# Patient Record
Sex: FEMALE | Race: WHITE
[De-identification: names, ages, dates, MRNs, and addresses within clinical notes are randomized per-mention and may not be internally consistent; named-entity substitution may affect disease eponyms.]

---

## 2020-11-15 ENCOUNTER — HOSPITAL ENCOUNTER (EMERGENCY)
Dept: HOSPITAL 100 - ED | Age: 50
Discharge: HOME | End: 2020-11-15
Payer: COMMERCIAL

## 2020-11-15 VITALS
DIASTOLIC BLOOD PRESSURE: 98 MMHG | OXYGEN SATURATION: 99 % | TEMPERATURE: 97.6 F | SYSTOLIC BLOOD PRESSURE: 182 MMHG | HEART RATE: 98 BPM | RESPIRATION RATE: 16 BRPM

## 2020-11-15 VITALS
OXYGEN SATURATION: 98 % | DIASTOLIC BLOOD PRESSURE: 87 MMHG | SYSTOLIC BLOOD PRESSURE: 148 MMHG | HEART RATE: 100 BPM | RESPIRATION RATE: 18 BRPM

## 2020-11-15 VITALS — BODY MASS INDEX: 31 KG/M2

## 2020-11-15 DIAGNOSIS — R10.9: Primary | ICD-10-CM

## 2020-11-15 LAB
LEUKOCYTE ESTERASE UR QL STRIP: 25 /UL
MUCOUS THREADS URNS QL MICRO: (no result) /HPF
RBC UR QL: (no result) /HPF (ref 0–5)
SP GR UR: 1.01 (ref 1–1.03)
SQUAMOUS URNS QL MICRO: (no result) /HPF (ref 5–10)
TRANSITIONAL EPITHELIAL - UR: (no result) /HPF (ref 0–5)
URINE PRESERVATIVE: (no result)

## 2020-11-15 PROCEDURE — 96376 TX/PRO/DX INJ SAME DRUG ADON: CPT

## 2020-11-15 PROCEDURE — 87088 URINE BACTERIA CULTURE: CPT

## 2020-11-15 PROCEDURE — 96374 THER/PROPH/DIAG INJ IV PUSH: CPT

## 2020-11-15 PROCEDURE — 74176 CT ABD & PELVIS W/O CONTRAST: CPT

## 2020-11-15 PROCEDURE — 81001 URINALYSIS AUTO W/SCOPE: CPT

## 2020-11-15 PROCEDURE — 96375 TX/PRO/DX INJ NEW DRUG ADDON: CPT

## 2020-11-15 PROCEDURE — 99283 EMERGENCY DEPT VISIT LOW MDM: CPT

## 2020-11-15 PROCEDURE — 96361 HYDRATE IV INFUSION ADD-ON: CPT

## 2020-11-15 PROCEDURE — A4216 STERILE WATER/SALINE, 10 ML: HCPCS

## 2020-11-15 PROCEDURE — 87086 URINE CULTURE/COLONY COUNT: CPT

## 2023-10-20 RX ORDER — BUPROPION HYDROCHLORIDE 300 MG/1
TABLET ORAL
COMMUNITY
End: 2023-12-08 | Stop reason: ALTCHOICE

## 2023-10-20 RX ORDER — BIOTIN 10 MG
1 TABLET ORAL DAILY
COMMUNITY

## 2023-10-20 RX ORDER — CHOLECALCIFEROL (VITAMIN D3) 25 MCG
TABLET ORAL
COMMUNITY

## 2023-10-23 ENCOUNTER — EVALUATION (OUTPATIENT)
Dept: PHYSICAL THERAPY | Facility: CLINIC | Age: 53
End: 2023-10-23
Payer: COMMERCIAL

## 2023-10-23 DIAGNOSIS — M79.672 PAIN IN LEFT FOOT: Primary | ICD-10-CM

## 2023-10-23 DIAGNOSIS — M79.671 PAIN IN RIGHT FOOT: ICD-10-CM

## 2023-10-23 DIAGNOSIS — M76.61 ACHILLES TENDINITIS, RIGHT LEG: ICD-10-CM

## 2023-10-23 DIAGNOSIS — M20.22 HALLUX RIGIDUS, LEFT FOOT: ICD-10-CM

## 2023-10-23 PROCEDURE — 97110 THERAPEUTIC EXERCISES: CPT | Mod: GP

## 2023-10-23 PROCEDURE — 97161 PT EVAL LOW COMPLEX 20 MIN: CPT | Mod: GP

## 2023-10-23 ASSESSMENT — PAIN SCALES - GENERAL: PAINLEVEL_OUTOF10: 3

## 2023-10-23 ASSESSMENT — PAIN - FUNCTIONAL ASSESSMENT: PAIN_FUNCTIONAL_ASSESSMENT: 0-10

## 2023-10-23 NOTE — Clinical Note
October 25, 2023     Patient: Narda Benoit   YOB: 1970   Date of Visit: 10/23/2023       To Whom It May Concern:    It is my medical opinion that Narda Benoit {Work release (duty restriction):32510}.    If you have any questions or concerns, please don't hesitate to call.         Sincerely,        Ashley Walls, PT    CC: No Recipients

## 2023-10-23 NOTE — PROGRESS NOTES
Physical Therapy    Physical Therapy Evaluation and Treatment      Patient Name: Narda Benoit  MRN: [id number]  Today's Date: 10/23/2023  Time Calculation  Start Time: 1531  Stop Time: 1610  Time Calculation (min): 39 min      Assessment:  PT Assessment Results: Decreased strength, Decreased range of motion, Impaired balance, Decreased mobility, Pain  Rehab Prognosis: Fair  Strengths: Ability to acquire knowledge, Capable of completing ADLs semi/independent, Premorbid level of function, Rehab experience  Barriers to Participation: Other (Comment) (chronicity of pain; hx of OA in B foot)  Patient demonstrating B foot pain, deficits in B ankle AROM/strength, restriction of surrounding musculature of B ankle, and deficits in functional mobility per LEFS score. Patient would benefit from skilled outpatient PT interventions 1x/week for 4 weeks to address above impairments and improve ease with standing/ambulation/transfers. Educated in intrinsic foot strengthening exercises to perform within painfree ROM. Increased weakness noted of foot intrinsics. No exacerbation of pain. HEP handout provided. Patient verbalized and demonstrated understanding of POC and HEP.    Plan:  Treatment/Interventions: Dry needling, Cryotherapy, Education/ Instruction, Electrical stimulation, Manual therapy, Self care/ home management, Therapeutic activities, Therapeutic exercises, Ultrasound  PT Plan: Skilled PT  PT Frequency: 1 time per week  Duration: 4 weeks up to 4 additional visits    Current Problem:   1. Pain in left foot  Follow Up In Physical Therapy      2. Achilles tendinitis, right leg Active Referral to Physical Therapy    Follow Up In Physical Therapy      3. Pain in right foot  Follow Up In Physical Therapy      4. Hallux rigidus, left foot  Follow Up In Physical Therapy          Subjective    General: Patient reports ~3 years ago having R achilles tear from walking [place]. Had performed PT prior and it was doing okay but now  "the pain is not calming down. R achilles tendonitis as well as rigid 1st toe on the L foot which makes walking more challenging. Big toe is more painful. Patient attempting to avoid surgery. Not wanting to perform injection as well d/t needing to have joint be \"pulled apart\" R foot pain into heel with tenderness to touch. Pain of L big toe only. Patient performing stretches at home which does help. Pain with standing, ambulation, and prolonged sitting when transferring into weightbearing.  General  Reason for Referral: B foot pain  Referred By: Jalil GRANT  Past Medical History Relevant to Rehab: Previous PT for R achilles tear; no recent PT interventions  Precautions:  Precautions  STEADI Fall Risk Score (The score of 4 or more indicates an increased risk of falling): 0  Precautions Comment: Hx of OA    Pain:  Pain Assessment  Pain Assessment: 0-10  Pain Score: 3  Pain Type: Chronic pain  Pain Location: Foot  Pain Orientation: Other (Comment) (bilateral)  Home Living:   No concerns for home living  Prior Level of Function:  Prior Function Per Pt/Caregiver Report  Level of [place]: Independent with ADLs and functional transfers    Objective     ANKLE    Ankle Palpation/Joint Mobility Assessment  Palpation/Joint Mobility Comment: R foot: pain with palpation of achilles insertion, plantar fascia at heel insertion; mod restriction of gastroc; L LE with increased stiffness of 1st MTP and pain. tenderness with palpation of L MTP and restriction of dorsum of foot near MTP  Ankle AROM WFL unless documented below  R ankle dorsiflexion: (10°): 10 deg with pain into the heel  L ankle dorsiflexion: (10°): 5 deg  R ankle plantarflexion: (40°): 40 deg  L ankle plantarflexion: (40°): 40 deg  R ankle inversion: (30°): 50 deg  L ankle inversion: (30°): 55 deg  R ankle eversion: (20°): 18 deg  L ankle eversion: (20°): 20 deg  Ankle MMT WFL unless documented below  R ankle dorsiflexion: (5/5): 5  L ankle dorsiflexion: (5/5): " 4  R ankle plantarflexion: (5/5): 4  L ankle plantarflexion: (5/5): 4  R ankle inversion: (5/5): 4  L ankle inversion: (5/5): 4  R ankle eversion: (5/5): 4  L ankle eversion: (5/5): 4    Outcome Measures:  Other Measures  Lower Extremity Funtional Score (LEFS): 60/80     Treatments:  Therapeutic Exercise  Therapeutic Exercise Performed: Yes    Education on POC and HEP  Seated arch lifts x10 each foot  Seated lesser toe extensions x10 each foot  Seated great toe extension x10 each foot  Towel scrunches x10 each foot    OP EDUCATION:  Education  Individual(s) Educated: Patient  Education Provided: POC, Body Mechanics, Home Exercise Program  Risk and Benefits Discussed with Patient/Caregiver/Other: yes  Patient/Caregiver Demonstrated Understanding: yes  Plan of Care Discussed and Agreed Upon: yes  Patient Response to Education: Patient/Caregiver Verbalized Understanding of Information, Patient/Caregiver Performed Return Demonstration of Exercises/Activities, Patient/Caregiver Asked Appropriate Questions  Education Comment: Access Code: [email or web address]  URL: [email or web address]  Date: 10/23/2023  Prepared by: Ashley Walls    Exercises  - Seated Arch Lifts  - 1 x daily - 7 x weekly - 1-2 sets - 10 reps  - Seated Lesser Toes Extension  - 1 x daily - 7 x weekly - 1-2 sets - 10 reps  - Seated Great Toe Extension  - 1 x daily - 7 x weekly - 1-2 sets - 10 reps  - Towel Scrunches  - 1 x daily - 7 x weekly - 1-2 sets - 10 reps    Goals:  Active       PT Goals       Goals       Start:  10/29/23    Expected End:  12/03/23       Increase in LEFS score by 10 points to demonstrate improved functional mobility of B ankles for ease with standing and walking.-Week 4  Improved gross B ankle, knee, hip musculature strength 5/5 MMT to increase stability/support with standing and ambulation at home, community, and work.-Week 4  Decrease in baseline pain of B feet 1/10 or less to demonstrate improved QOL-Week  4  Improved B ankle AROM DF: 10 deg painfree for improved ease with ADL/iADL completion in standing/weightbearing-Week 4  Patient will demonstrate compliance in their home exercise program in order to promote independence in self management of functional mobility.-Week 2

## 2023-10-23 NOTE — Clinical Note
October 25, 2023     Patient: Narda Benoit   YOB: 1970   Date of Visit: 10/23/2023       To Whom it May Concern:    Narda Benoit was seen in my clinic on 10/23/2023. She {Return to school/sport:25179}.    If you have any questions or concerns, please don't hesitate to call.         Sincerely,          Ashley Walls, PT        CC: No Recipients

## 2023-10-29 ASSESSMENT — PATIENT HEALTH QUESTIONNAIRE - PHQ9
2. FEELING DOWN, DEPRESSED OR HOPELESS: NOT AT ALL
1. LITTLE INTEREST OR PLEASURE IN DOING THINGS: NOT AT ALL
SUM OF ALL RESPONSES TO PHQ9 QUESTIONS 1 AND 2: 0

## 2023-11-03 ENCOUNTER — TREATMENT (OUTPATIENT)
Dept: PHYSICAL THERAPY | Facility: CLINIC | Age: 53
End: 2023-11-03
Payer: COMMERCIAL

## 2023-11-03 DIAGNOSIS — M79.671 PAIN IN RIGHT FOOT: ICD-10-CM

## 2023-11-03 DIAGNOSIS — M76.61 ACHILLES TENDINITIS, RIGHT LEG: ICD-10-CM

## 2023-11-03 DIAGNOSIS — M20.22 HALLUX RIGIDUS, LEFT FOOT: ICD-10-CM

## 2023-11-03 DIAGNOSIS — M79.672 PAIN IN LEFT FOOT: ICD-10-CM

## 2023-11-03 PROCEDURE — 97110 THERAPEUTIC EXERCISES: CPT | Mod: GP,CQ

## 2023-11-03 ASSESSMENT — PAIN - FUNCTIONAL ASSESSMENT: PAIN_FUNCTIONAL_ASSESSMENT: 0-10

## 2023-11-03 ASSESSMENT — PAIN SCALES - GENERAL: PAINLEVEL_OUTOF10: 3

## 2023-11-03 NOTE — PROGRESS NOTES
"Physical Therapy Treatment    Patient Name: Narda Benoit  MRN: [id number]  Today's Date: 11/3/2023  Time Calculation  Start Time: 1000  Stop Time: 1043  Time Calculation (min): 43 min      Assessment:  Fair tolerance with TE with no c/o increased discomfort.  Pt. Had difficulty with towel crunches.  States big toe does not like to move especially into extensions.  Pt. Continues with tightness with Achilles tendon with the right ankle.  Handout provided and reviewed with patient.       Plan:  Continue with strengthening, ROM and flexibility to improve ambulation and stairs with greater ease.  MB     Treatment/Interventions: Dry needling, Cryotherapy, Education/ Instruction, Electrical stimulation, Manual therapy, Self care/ home management, Therapeutic activities, Therapeutic exercises, Ultrasound  PT Plan: Skilled PT  PT Frequency: 1 time per week  Duration: 4 weeks up to 4 additional visits    Current Problem  Problem List Items Addressed This Visit             ICD-10-CM    Pain in left foot M79.672    Pain in right foot M79.671    Achilles tendinitis, right leg M76.61    Hallux rigidus, left foot M20.22       Subjective   Pt. States she's compliant with exercises.  Foot is stiff in the mornings.  Pt. Has difficulty with stairs especially when going down.     Precautions   STEADI Fall Risk Score (The score of 4 or more indicates an increased risk of falling): 0  Precautions Comment: Hx of OA     Pain  Pain Assessment: 0-10  Pain Score: 3  Pain Type: Chronic pain  Pain Location: Foot    Treatments:  Therapeutic Exercise  Therapeutic Exercise Performed: Yes    Therapeutic Exercise:  mins/units   Stepper, Lv 1 x5 mins (N)  Slant board x2  30\" ea B  (N)  Seated arch lifts x10 each foot  Seated lesser toe extensions x10 each foot  Seated great toe extension x10 each foot  Towel scrunches x10 each foot  Seated DF stretches w/strap x10  10\" hold B (N)  Seated ankle DF/PF, orange band x10  (N)  Seated toe  " "w/highlighter x10 ea (N)  PROM to Achilles tendon and great toes (N)    OP EDUCATION:   Access Code: [email or web address]  URL: [email or web address]  Date: 11/03/2023  Prepared by: Patricia Allen    Exercises  - Slant Board Gastrocnemius Stretch  - 1 x daily - 7 x weekly - 1 sets - 2 reps - 60\" hold  - Long Sitting Calf Stretch with Strap  - 1 x daily - 7 x weekly - 1 sets - 10 reps - 10\" hold  - Ankle Dorsiflexion with Resistance  - 1 x daily - 7 x weekly - 2 sets - 10 reps  - Ankle Plantar Flexion with Resistance  - 1 x daily - 7 x weekly - 2 sets - 10 reps    Goals:  Active       PT Goals       Goals       Start:  10/29/23    Expected End:  12/03/23       Increase in LEFS score by 10 points to demonstrate improved functional mobility of B ankles for ease with standing and walking.-Week 4  Improved gross B ankle, knee, hip musculature strength 5/5 MMT to increase stability/support with standing and ambulation at home, community, and work.-Week 4  Decrease in baseline pain of B feet 1/10 or less to demonstrate improved QOL-Week 4  Improved B ankle AROM DF: 10 deg painfree for improved ease with ADL/iADL completion in standing/weightbearing-Week 4  Patient will demonstrate compliance in their home exercise program in order to promote independence in self management of functional mobility.-Week 2            "

## 2023-11-13 ENCOUNTER — APPOINTMENT (OUTPATIENT)
Dept: PHYSICAL THERAPY | Facility: CLINIC | Age: 53
End: 2023-11-13
Payer: COMMERCIAL

## 2023-11-20 ENCOUNTER — APPOINTMENT (OUTPATIENT)
Dept: PHYSICAL THERAPY | Facility: CLINIC | Age: 53
End: 2023-11-20
Payer: COMMERCIAL

## 2023-11-27 ENCOUNTER — APPOINTMENT (OUTPATIENT)
Dept: PHYSICAL THERAPY | Facility: CLINIC | Age: 53
End: 2023-11-27
Payer: COMMERCIAL

## 2023-12-06 ENCOUNTER — TELEPHONE (OUTPATIENT)
Dept: URGENT CARE | Facility: CLINIC | Age: 53
End: 2023-12-06

## 2023-12-06 ENCOUNTER — OFFICE VISIT (OUTPATIENT)
Dept: URGENT CARE | Facility: CLINIC | Age: 53
End: 2023-12-06
Payer: COMMERCIAL

## 2023-12-06 VITALS
WEIGHT: 165 LBS | TEMPERATURE: 99.7 F | DIASTOLIC BLOOD PRESSURE: 96 MMHG | OXYGEN SATURATION: 98 % | HEIGHT: 62 IN | RESPIRATION RATE: 16 BRPM | HEART RATE: 116 BPM | BODY MASS INDEX: 30.36 KG/M2 | SYSTOLIC BLOOD PRESSURE: 156 MMHG

## 2023-12-06 DIAGNOSIS — J06.9 VIRAL URI: ICD-10-CM

## 2023-12-06 DIAGNOSIS — H65.01 NON-RECURRENT ACUTE SEROUS OTITIS MEDIA OF RIGHT EAR: Primary | ICD-10-CM

## 2023-12-06 DIAGNOSIS — U07.1 COVID: ICD-10-CM

## 2023-12-06 DIAGNOSIS — Z20.822 CLOSE EXPOSURE TO COVID-19 VIRUS: ICD-10-CM

## 2023-12-06 LAB — POC SARS-COV-2 AG: ABNORMAL

## 2023-12-06 PROCEDURE — 99213 OFFICE O/P EST LOW 20 MIN: CPT | Mod: 25 | Performed by: NURSE PRACTITIONER

## 2023-12-06 RX ORDER — AMOXICILLIN 875 MG/1
875 TABLET, FILM COATED ORAL 2 TIMES DAILY
Qty: 14 TABLET | Refills: 0 | Status: SHIPPED | OUTPATIENT
Start: 2023-12-06 | End: 2023-12-13

## 2023-12-06 NOTE — PROGRESS NOTES
53 y.o. female presents for evaluation of URI.  Symptoms including cough, congestion, body aches, malaise, bilateral ear pain and headache have been present for several days and refractory to OTC meds.  No fever, chills, nausea, vomiting, abdominal pain, CP, or SOB.  No exacerbating factors. Known COVID 19 exposure from .    Vitals:    12/06/23 1049   BP: (!) 156/96   Pulse: (!) 116   Resp: 16   Temp: 37.6 °C (99.7 °F)   SpO2: 98%       Allergies   Allergen Reactions    Sulfa Dyne Other and Unknown     Causes headaches    Nitrofurantoin Monohyd/M-Cryst Hives    Prednisone Other     hypertensive crisis       Medication Documentation Review Audit       Reviewed by DEBBIE Esteban (Nurse Practitioner) on 12/06/23 at 1107      Medication Order Taking? Sig Documenting Provider Last Dose Status   amoxicillin (Amoxil) 875 mg tablet [id number]  Take 1 tablet (875 mg) by mouth 2 times a day for 7 days. DEBBIE Esteban  Active   biotin 10 mg tablet [id number] Yes Take 1 tablet (10 mg) by mouth once daily. Historical Provider, MD Taking Active   buPROPion XL (Wellbutrin XL) 300 mg 24 hr tablet [id number] No 1 tab(s) orally every 24 hours Historical Provider, MD Not Taking Active   cholecalciferol (Vitamin D-3) 25 MCG (1000 UT) tablet [id number] Yes orally once a day Historical Provider, MD Taking Active   fexofenadine ODT (Allegra ODT) 30 mg disintegrating tablet [id number] No orally once a day Historical Provider, MD Not Taking Active   loratadine-pseudoephedrine (Claritin-D 12-hour) 5-120 mg 12 hr tablet [id number] Yes Take 1 tablet by mouth 2 times a day. Do not crush, chew, or split. Historical Provider, MD Taking Active   NON FORMULARY [id number] Yes estratone   - orally once a day Historical Provider, MD Taking Active                    History reviewed. No pertinent past medical history.    History reviewed. No pertinent surgical history.    ROS  See HPI    Physical Exam  Vitals and nursing note  reviewed.   Constitutional:       Appearance: She is ill-appearing (mildly).   HENT:      Head: Normocephalic and atraumatic.      Right Ear: Hearing and ear canal normal. Tympanic membrane is erythematous. Tympanic membrane is not bulging.      Left Ear: Hearing and ear canal normal. A middle ear effusion is present. Tympanic membrane is not erythematous or bulging.      Nose: Nose normal.      Mouth/Throat:      Mouth: Mucous membranes are moist.      Pharynx: Oropharynx is clear.   Eyes:      Extraocular Movements: Extraocular movements intact.      Conjunctiva/sclera: Conjunctivae normal.      Pupils: Pupils are equal, round, and reactive to light.   Cardiovascular:      Rate and Rhythm: Regular rhythm. Tachycardia present.      Heart sounds: Normal heart sounds.   Pulmonary:      Effort: Pulmonary effort is normal.      Breath sounds: Normal breath sounds.   Lymphadenopathy:      Cervical: Cervical adenopathy present.   Skin:     General: Skin is warm and dry.      Capillary Refill: Capillary refill takes less than 2 seconds.   Neurological:      General: No focal deficit present.      Mental Status: She is alert and oriented to person, place, and time.   Psychiatric:         Mood and Affect: Mood normal.         Behavior: Behavior normal.       Recent Results (from the past 1 hour(s))   POCT BD Veritor COVID-19 AG    Collection Time: 12/06/23 11:43 AM   Result Value Ref Range    POC SARS-CoV Ag Positive test for SARS-CoV-2 (antigen detected) (A) Presumptive negative test for SARS-CoV-2 (no antigen detected)       Assessment/Plan/MDM  Narda was seen today for uri.  Diagnoses and all orders for this visit:  Non-recurrent acute serous otitis media of right ear (Primary)  -     amoxicillin (Amoxil) 875 mg tablet; Take 1 tablet (875 mg) by mouth 2 times a day for 7 days.  Viral URI  -     POCT BD Veritor COVID-19 AG  Close exposure to COVID-19 virus  -     POCT BD Veritor COVID-19 AG  COVID    Encouraged pt to  continue otc cold remedies PRN, push by mouth fluids and rest. Patient's clinical presentation is otherwise unremarkable at this time. Patient is discharged with instructions to follow-up with primary care or seek emergency medical attention for worsening symptoms or any new concerns.    Ricki Hicks CNP  [facility]  [phone]

## 2023-12-06 NOTE — TELEPHONE ENCOUNTER
CALLED PATIENT WITH TEST RESULTS, I INFORMED HER SHE WAS POSITIVE FOR COVID. I ALSO, INFORMED HER THE CDC SAYS TO QUARANTINE FORE 5 DAYS FROM THE TIME HER SYMPTOMS STARTED AND TO WEAR A MASK FOR REMAINDER OF THE TIME IF SYMPTOMS ARE GONE. IF STILL HAVING SYMPTOMS CONTINUE TO QUARANTINE FOR THE REMAINDER OF THE 5 DAYS.

## 2023-12-08 ENCOUNTER — OFFICE VISIT (OUTPATIENT)
Dept: URGENT CARE | Facility: CLINIC | Age: 53
End: 2023-12-08
Payer: COMMERCIAL

## 2023-12-08 VITALS
DIASTOLIC BLOOD PRESSURE: 85 MMHG | TEMPERATURE: 98.1 F | OXYGEN SATURATION: 98 % | WEIGHT: 165 LBS | HEART RATE: 92 BPM | RESPIRATION RATE: 16 BRPM | HEIGHT: 62 IN | SYSTOLIC BLOOD PRESSURE: 136 MMHG | BODY MASS INDEX: 30.36 KG/M2

## 2023-12-08 DIAGNOSIS — U07.1 COVID: Primary | ICD-10-CM

## 2023-12-08 PROCEDURE — 99213 OFFICE O/P EST LOW 20 MIN: CPT | Mod: 25 | Performed by: NURSE PRACTITIONER

## 2023-12-08 NOTE — PROGRESS NOTES
[facility]  Roxy WARREN Ross, APRN-CNP     Visit Note - 12/8/2023 12:21 PM   This note was generated with voice recognition software and may contain errors including spelling, grammar, syntax, and misrecognization of what was dictated.    Patient: Narda Benoit, MRN: [id number], 53 y.o., female   PCP: Charlie Trevino MD  ------------------------------------  ALLERGIES:   Allergies   Allergen Reactions    Sulfa Dyne Other and Unknown     Causes headaches    Nitrofurantoin Monohyd/M-Cryst Hives    Prednisone Other     hypertensive crisis        CURRENT MEDICATIONS:   Current Outpatient Medications   Medication Instructions    amoxicillin (AMOXIL) 875 mg, oral, 2 times daily    biotin 10 mg tablet 1 tablet, oral, Daily    cholecalciferol (Vitamin D-3) 25 MCG (1000 UT) tablet orally once a day    loratadine-pseudoephedrine (Claritin-D 12-hour) 5-120 mg 12 hr tablet 1 tablet, oral, 2 times daily, Do not crush, chew, or split.    NON FORMULARY <BR>estratone <BR>- orally once a day<BR><BR>     ------------------------------------  PAST MEDICAL HX:  Patient Active Problem List   Diagnosis    Pain in left foot    Pain in right foot    Achilles tendinitis, right leg    Hallux rigidus, left foot    Also reports history of fibromyalgia.   SURGICAL HX:  History reviewed. No pertinent surgical history.   FAMILY HX:   No pertinent history.   SOCIAL HX:    reports that she has never smoked. She has never used smokeless tobacco. . Employed - is a .   ------------------------------------  CHIEF COMPLAINT:   Chief Complaint   Patient presents with    URI     Bilateral ear pain, sinus pressure. Evaluated 12/6, currently on amoxicillin day 3 reports sx are worsening       HISTORY OF PRESENT ILLNESS: The history was obtained from patientBianca Laboy is a 53 y.o. female, who presents with a chief complaint of bilat ear discomfort, sinus pressure, nasal congestion (clear mucus), sinus  "headaches, fevers/chills (Tmax 101f) - sxs started on Monday night. She has also had intermittent nausea, but she attributes this to her current antibiotic - she was seen on 12/6/23 by another provider and started on Amoxicillin (prior to her testing + for COVID). She denies any abdominal pain, chest pain, wheezing/shortness of breath, rashes, urinary symptoms, vomiting, and diarrhea. Denies any lightheadedness or dizziness; no changes in mental status. No swelling in legs. Appetite is decreased ; is able to eat and drink fluids without difficulty; denies loss of sense of taste or smell. Reports symptoms are unchanged since onset - reports is concerned and feels her COVID symptoms have resolved, but is worried she might have a sinus infection now; also wonders when she can return to work. Has been taking  OTC medications  without much relief; no other over-the-counter medications or home remedies for symptom management.  Her  also recently tested + for COVID; no other known ill contacts. Has not received the COVID vaccine. No known history of COVID infection.  No known history of asthma/COPD/respiratory issues      REVIEW OF SYSTEMS:  10 systems reviewed negative with exception of history of present illness as listed above.    TODAY'S VITALS: /85 (BP Location: Right arm, Patient Position: Sitting, BP Cuff Size: Large adult)   Pulse 92   Temp 36.7 °C (98.1 °F) (Temporal)   Resp 16   Ht 1.575 m (5' 2\")   Wt 74.8 kg (165 lb)   SpO2 98%   BMI 30.18 kg/m²     PHYSICAL EXAMINATION:  General:  Mildly ill-appearing, well nourished female; alert and oriented; in no acute distress. Sitting comfortably on exam table. Non-dyspneic.   Eyes:  Pupils equal, round and reactive to light. No conjunctival erythema; no scleral icterus.   HENT: Mild frontal, ethmoid, and maxillary sinus tenderness; + audible nasal congestion. Airway patent, TMs both slightly dull but no erythema, and not retracted or bulging. Ear " canals clear/unremarkable bilaterally. Nasal mucosa mildly injected and edematous. Oral mucosa moist. Posterior pharynx mildly injected but without vesicles or oropharyngeal exudate aside from PND. Uvula is midline. Managing oral secretions without difficulty.  Neck:  Supple. Mildly tender, mobile anterior cervical lymphadenopathy bilat. Trachea is midline.  Respiratory:  Respirations easy and unlabored, Breath sounds equal. Lungs are clear to auscultation; no wheezes, rhonchi, or rales; has good air movement throughout. + non-productive cough noted. Non-dyspneic with ambulation; able to maintain SpO2.  Cardiovascular:  Normal rate, Regular rhythm. Normal S1S2. No m/r/g. No peripheral edema.   Gastrointestinal:  Soft, non-tender, non-distended; no palpable masses or organomegaly. Bowel sounds normoactive.  Musculoskeletal:  Grossly normal; appropriate for age.     Integumentary:  Pink, warm, dry, and intact. No rashes or skin discoloration appreciated. Good skin turgor.  Neurologic:  Alert and oriented, no gross deficits.    Cognition and Speech:  Oriented, Speech clear and coherent.    Psychiatric:  Cooperative, Appropriate mood & affect.       ------------------------------------  Medical Decision Making  LABORATORY or RADIOLOGICAL IMAGING ORDERS/RESULTS:   None    IMPRESSION/PLAN:  Course: Worsening; stable    1. COVID  No red flags on exam today. I have reviewed the  COVID-19 algorithm, and counseled pt on COVID-19 current recommendations. Symptoms remain consistent with COVID infection; is also currently on Amoxicillin, which should cover for a potential secondary bacterial infection, but low suspicion for bacterial infection currently. No additional antibiotics indicated at this point, but anticipatory guidance provided, and stressed importance of continuing conservative measures. Instructed to push fluids, rest, and to use appropriate over the counter medications as needed for management of symptoms- saline  nasal spray, plain Mucinex, and vaporizer may be helpful. Reviewed instructions for self-isolation and continued monitoring. Reviewed red flags to monitor for, counseled on potential adverse reactions of treatments, expectations for improvement in sxs, and advised to follow-up with primary care provider in 3-5 days if symptoms persist, or to seek care sooner if worsening or if any additional concerns/red flags develop.  Patient agreed with plan of care; questions were encouraged and answered.       DIANA Howell-CNP   Advanced Practice Provider  [facility]

## 2024-01-12 ENCOUNTER — DOCUMENTATION (OUTPATIENT)
Dept: PHYSICAL THERAPY | Facility: CLINIC | Age: 54
End: 2024-01-12
Payer: COMMERCIAL

## 2024-01-12 NOTE — PROGRESS NOTES
Physical Therapy                 Therapy Communication Note    Patient Name: Narda Benoit  MRN: [id number]  Today's Date: 1/12/2024     Discipline: Physical Therapy    Patient to be D/C from skilled physical therapy at this time d/t patient not making/maintaining scheduled appointments. Patient last seen >=30 days ago. Please refer to previous notes to see functional baseline. Further functional measures not obtained d/t limited visits.

## 2024-01-26 ENCOUNTER — OFFICE VISIT (OUTPATIENT)
Dept: URGENT CARE | Facility: CLINIC | Age: 54
End: 2024-01-26
Payer: COMMERCIAL

## 2024-01-26 VITALS
TEMPERATURE: 98.4 F | OXYGEN SATURATION: 100 % | BODY MASS INDEX: 30.36 KG/M2 | HEART RATE: 82 BPM | WEIGHT: 165 LBS | HEIGHT: 62 IN | SYSTOLIC BLOOD PRESSURE: 146 MMHG | RESPIRATION RATE: 14 BRPM | DIASTOLIC BLOOD PRESSURE: 90 MMHG

## 2024-01-26 DIAGNOSIS — J01.90 ACUTE SINUSITIS, RECURRENCE NOT SPECIFIED, UNSPECIFIED LOCATION: Primary | ICD-10-CM

## 2024-01-26 PROCEDURE — 99213 OFFICE O/P EST LOW 20 MIN: CPT | Mod: 25 | Performed by: NURSE PRACTITIONER

## 2024-01-26 RX ORDER — AZITHROMYCIN 250 MG/1
TABLET, FILM COATED ORAL
Qty: 6 TABLET | Refills: 0 | Status: SHIPPED | OUTPATIENT
Start: 2024-01-26

## 2024-01-26 NOTE — PROGRESS NOTES
[facility] URG[facility]  Roxy Hawkins, APRN-CNP     Visit Note - 1/26/2024 4:02 PM   This note was generated with voice recognition software and may contain errors including spelling, grammar, syntax, and misrecognization of what was dictated.    Patient: Narda Benoit, MRN: [id number], 53 y.o., female   PCP: Charlie Trevino MD  ------------------------------------  ALLERGIES:   Allergies   Allergen Reactions    Sulfa Dyne Other and Unknown     Causes headaches    Nitrofurantoin Monohyd/M-Cryst Hives    Prednisone Other     hypertensive crisis        CURRENT MEDICATIONS:   Current Outpatient Medications   Medication Instructions    azithromycin (Zithromax Z-Salomón) 250 mg tablet Take 2 tablets by mouth at once on day 1, then 1 tablet once a day on days 2-5. Take with a meal.    biotin 10 mg tablet 1 tablet, oral, Daily    cholecalciferol (Vitamin D-3) 25 MCG (1000 UT) tablet orally once a day    loratadine-pseudoephedrine (Claritin-D 12-hour) 5-120 mg 12 hr tablet 1 tablet, oral, 2 times daily, Do not crush, chew, or split.    NON FORMULARY <BR>estratone <BR>- orally once a day<BR><BR>     ------------------------------------  PAST MEDICAL HX:  Patient Active Problem List   Diagnosis    Pain in left foot    Pain in right foot    Achilles tendinitis, right leg    Hallux rigidus, left foot      SURGICAL HX:  History reviewed. No pertinent surgical history.   FAMILY HX:   No pertinent history.   SOCIAL HX:    reports that she has never smoked. She has never used smokeless tobacco.  ------------------------------------  CHIEF COMPLAINT:   Chief Complaint   Patient presents with    URI     SINUS PRESSURE, CONGESTION, HEADACHE X 2 WEEKS      HISTORY OF PRESENT ILLNESS: The history was obtained from patientBianca Laboy is a 53 y.o. female, who presents with a chief complaint of nasal congestion (yellow/green mucus), sinus pain, ears feel plugged, and a cough in the AM (productive) - sxs started 2 weeks ago.  "Denies any fever/chills, body aches, sore throat, abdominal pain, chest pain, wheezing/shortness of breath, rashes, urinary symptoms, nausea/vomiting, and diarrhea. Denies any lightheadedness or dizziness; no changes in mental status. No swelling in legs. Appetite is decreased ; is able to eat and drink fluids without difficulty; reports loss of sense of taste but sense of smell is intact. Reports symptoms have gotten worse since onset. Has been taking  Claritin, Flonase, and using Saline Nasal Spray  without much relief; no other over-the-counter medications or home remedies for symptom management.  Reports several of her grandkids are currently sick with similar sxs; no other known ill contacts. Has received the COVID vaccine x 2 . Tested + for COVID on 12/8/23 - sxs resolved fully until ~1 week ago.Is not a smoker.  No known history of asthma/COPD/respiratory issues.       REVIEW OF SYSTEMS:  10 systems reviewed negative with exception of history of present illness as listed above.    TODAY'S VITALS: /90   Pulse 82   Temp 36.9 °C (98.4 °F)   Resp 14   Ht 1.575 m (5' 2\")   Wt 74.8 kg (165 lb)   SpO2 100%   BMI 30.18 kg/m²     PHYSICAL EXAMINATION:  General: Mildly ill-appearing, well nourished female; alert and oriented, in no acute distress. + audible nasal congestion.  Eyes:  Pupils equal, round and reactive to light. No conjunctival erythema; no scleral icterus.   HENT: + maxillary>frontal sinus tenderness, with audible nasal congestion. Bilat ear canals clear/unremarkable, but TMs with clear effusions bilat; no erythema, and not bulging. Nasal mucosa moderately boggy and edematous. Airway patent, oral mucosa moist. Posterior pharynx mildly injected but without vesicles or oropharyngeal exudate aside from PND. Uvula is midline. Trachea is midline. Managing oral secretions without difficulty.  Neck:  Supple. Tender, mobile anterior cervical lymphadenopathy bilat.  Respiratory:  Lungs are clear to " auscultation; no wheezes, rhonchi, or rales. Respirations unlabored, Breath sounds are equal, Symmetrical chest wall expansion. + productive cough noted.   Cardiovascular:  Normal rate, Regular rhythm. Normal S1S2. No m/r/g. No peripheral edema.   Gastrointestinal:  Soft, non-tender, non-distended; no palpable masses or organomegaly. Bowel sounds normoactive.  Musculoskeletal:  Grossly normal  Integumentary:  Pink, warm, dry, and intact. No rashes or skin discoloration appreciated.    Neurologic:  Alert and oriented, no focal deficits, no motor or sensory deficits.    Cognition and Speech:  Oriented, Speech clear and coherent.    Psychiatric:  Cooperative, Appropriate mood & affect.       ------------------------------------  Medical Decision Making  LABORATORY or RADIOLOGICAL IMAGING ORDERS/RESULTS:   None.    IMPRESSION/PLAN:  Course: Worsening; stable    1. Acute sinusitis, recurrence not specified, unspecified location  - azithromycin (Zithromax Z-Salomón) 250 mg tablet; Take 2 tablets by mouth at once on day 1, then 1 tablet once a day on days 2-5. Take with a meal.  Dispense: 6 tablet; Refill: 0    No red flags on exam today. Symptoms consistent with sinusitis, although reviewed other potential etiologies. Due to severity/duration of sxs, will begin treatment for bacterial infection today (Zithromax, per pt's request - reports has bothersome GI symptoms with Augmentin and Doxycycline). Should finish full course of antibiotics, even if symptoms resolve more quickly. Instructed to push fluids, rest, and to use appropriate over the counter medications as needed for management of symptoms - discussed that Mucinex, vaporizer, and Saline Nasal Spray may be helpful.  Reviewed red flags to monitor for, counseled on potential adverse reactions of treatments, expectations for improvement in sxs, and advised to follow-up with primary care provider in 3-5 days if symptoms persist, or sooner if worsening or if any additional  concerns/red flags develop.  Patient verbalized understanding and agreed with plan of care; questions were encouraged and answered.      DIANA Howell-CNP   Advanced Practice Provider  [facility]

## 2024-04-29 ENCOUNTER — APPOINTMENT (OUTPATIENT)
Dept: GASTROENTEROLOGY | Facility: CLINIC | Age: 54
End: 2024-04-29
Payer: COMMERCIAL